# Patient Record
Sex: MALE | Race: WHITE
[De-identification: names, ages, dates, MRNs, and addresses within clinical notes are randomized per-mention and may not be internally consistent; named-entity substitution may affect disease eponyms.]

---

## 2022-09-22 PROBLEM — Z00.00 ENCOUNTER FOR PREVENTIVE HEALTH EXAMINATION: Status: ACTIVE | Noted: 2022-09-22

## 2022-09-26 DIAGNOSIS — Z87.448 PERSONAL HISTORY OF OTHER DISEASES OF URINARY SYSTEM: ICD-10-CM

## 2022-09-26 DIAGNOSIS — Z87.442 PERSONAL HISTORY OF URINARY CALCULI: ICD-10-CM

## 2022-09-26 DIAGNOSIS — K46.9 UNSPECIFIED ABDOMINAL HERNIA W/OUT OBSTRUCTION OR GANGRENE: ICD-10-CM

## 2022-09-26 DIAGNOSIS — Z72.89 OTHER PROBLEMS RELATED TO LIFESTYLE: ICD-10-CM

## 2022-09-26 DIAGNOSIS — Z86.59 PERSONAL HISTORY OF OTHER MENTAL AND BEHAVIORAL DISORDERS: ICD-10-CM

## 2022-09-26 RX ORDER — DEXTROAMPHETAMINE SULFATE, DEXTROAMPHETAMINE SACCHARATE, AMPHETAMINE SULFATE AND AMPHETAMINE ASPARTATE 5; 5; 5; 5 MG/1; MG/1; MG/1; MG/1
20 CAPSULE, EXTENDED RELEASE ORAL
Refills: 0 | Status: ACTIVE | COMMUNITY

## 2022-09-27 ENCOUNTER — APPOINTMENT (OUTPATIENT)
Dept: SURGERY | Facility: CLINIC | Age: 32
End: 2022-09-27

## 2022-09-27 VITALS — SYSTOLIC BLOOD PRESSURE: 185 MMHG | DIASTOLIC BLOOD PRESSURE: 113 MMHG

## 2022-09-27 VITALS
TEMPERATURE: 98.3 F | SYSTOLIC BLOOD PRESSURE: 187 MMHG | HEIGHT: 73 IN | HEART RATE: 103 BPM | RESPIRATION RATE: 20 BRPM | OXYGEN SATURATION: 99 % | BODY MASS INDEX: 25.18 KG/M2 | DIASTOLIC BLOOD PRESSURE: 120 MMHG | WEIGHT: 190 LBS

## 2022-09-27 PROCEDURE — 99204 OFFICE O/P NEW MOD 45 MIN: CPT

## 2022-09-27 NOTE — REASON FOR VISIT
[Initial Eval - Existing Diagnosis] : an initial evaluation of an existing diagnosis [FreeTextEntry1] : RIGHT inguinal hernia

## 2022-09-27 NOTE — PHYSICAL EXAM
[Respiratory Effort] : normal respiratory effort [Calm] : calm [JVD] : no jugular venous distention  [de-identified] : NAD [de-identified] : normal [de-identified] : soft, nontender, + small right inguinal hernia.  No bulge.  No palpable hernia on the left.  [de-identified] : some red splotches on neck, presumable related to mild anxiety

## 2022-09-27 NOTE — HISTORY OF PRESENT ILLNESS
[de-identified] : Mr. ERNESTO LEON presents today with discomfort in the right groin.  He has had the hernia for approx 2 years.  He is not aware of a bulge in the groin however over the past 2 weeks the hernia has become increasingly symptomatic.  It is causing him a "pinching" sensation, and some tugging sensation that radiates to the groin.  There is a slight suggestion of some pressure in the left groin as well, but not as significant as the right.  He  denies any obstructive symptoms such as nausea/vomiting or change in bowel habits.  Mr. LEON states that increased activity such as riding his [place], and coughing or sneezing seem to aggravate the symptoms.  Rest seems to relieve them.  His PCP, Dr. Jimenes was able to diagnose a hernia on physical exam and he is now here to discuss further work up and management.\par His past medical hx includes a hydrocele repair at age 3-4, tonsellectomy, ear tubes, growth plate repair, htn (trying to manage with weight loss and exercise, and avoid meds)\par He takes Adderol daily and has NKDA.\par He plans to stay at his moms after surgery for help in the first few days.  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 \par

## 2022-09-27 NOTE — PLAN
[FreeTextEntry1] : ERNESTO LEON has a RIGHT inguinal hernia determined by physical exam.  We discussed the options for management which include observation of asymptomatic or minimally symptomatic hernias, as well as surgical repair.  I have recommended a laparoscopic/robotic approach to this particular hernia given the quicker recovery time and superior visualization of the anatomy.  We discussed the risks of the surgery which include, but are not limited to injury to the gonadal structures, intestinal injury, vascular injury, hernia recurrence, mesh infection, seroma, or hematoma.  We discussed the likelihood of bruising and swelling in the immediate post op period in the groin and that this should be managed with ice packs for the first 24 hrs. \par Post operative activity restrictions include no heavy lifting or core activities/straining for 10 days. Routine daily activities can be resumed in 1-2 days and light activity (such as walking for exercise or jogging) can be resumed in 7 days.  Driving can be resumed when pain level is felt to be manageable.   No swimming in pool or hot tub for 1 week post operatively.  Return to work is generally in 7-10 days, but is individualized according to the patient.  Mr. LEON understands the risks benefits and alternatives of the proposed inguinal hernia repair and would like to proceed.\par \par We will determine a date for surgery at the his convenience and obtain medical clearance as appropriate. \par \par \par

## 2022-09-27 NOTE — CONSULT LETTER
[Dear  ___] : Dear  [unfilled], [Consult Letter:] : I had the pleasure of evaluating your patient, [unfilled]. [Please see my note below.] : Please see my note below. [Consult Closing:] : Thank you very much for allowing me to participate in the care of this patient.  If you have any questions, please do not hesitate to contact me. [Sincerely,] : Sincerely, [FreeTextEntry3] : Venecia Coppola MD FACS\par Acute Care and General Surgery\par [facility]\par \par Office phone: [phone]\par Cell phone:  [phone]\par email:  [email or web address]\par

## 2022-10-05 ENCOUNTER — TRANSCRIPTION ENCOUNTER (OUTPATIENT)
Age: 32
End: 2022-10-05

## 2022-10-05 ENCOUNTER — APPOINTMENT (OUTPATIENT)
Dept: SURGERY | Facility: HOSPITAL | Age: 32
End: 2022-10-05

## 2022-10-25 ENCOUNTER — APPOINTMENT (OUTPATIENT)
Dept: SURGERY | Facility: CLINIC | Age: 32
End: 2022-10-25

## 2022-10-25 VITALS
BODY MASS INDEX: 25.18 KG/M2 | WEIGHT: 190 LBS | HEIGHT: 73 IN | DIASTOLIC BLOOD PRESSURE: 102 MMHG | SYSTOLIC BLOOD PRESSURE: 180 MMHG | TEMPERATURE: 97.6 F | HEART RATE: 99 BPM

## 2022-10-25 DIAGNOSIS — K40.90 UNILATERAL INGUINAL HERNIA, W/OUT OBSTRUCTION OR GANGRENE, NOT SPECIFIED AS RECURRENT: ICD-10-CM

## 2022-10-25 PROCEDURE — 99024 POSTOP FOLLOW-UP VISIT: CPT

## 2022-10-25 NOTE — PLAN
[FreeTextEntry1] : Pt may resume all normal activities including exercise and sports.  No restrictions.  Follow up as needed.

## 2022-10-25 NOTE — HISTORY OF PRESENT ILLNESS
[de-identified] : 32 yr old who is now s/p robotic RIH repair on 10/5/22 doing well.  He states he had an easy recovery and after taking it easy for a few days, was back to work and is now ready to resume all normal activites.

## 2022-10-25 NOTE — PHYSICAL EXAM
[de-identified] : NAD [de-identified] : abdominal incisions well healed.  no bruising.  no ecchymosis in groin.

## 2022-10-25 NOTE — CONSULT LETTER
[Dear  ___] : Dear  [unfilled], [Courtesy Letter:] : I had the pleasure of seeing your patient, [unfilled], in my office today. [Please see my note below.] : Please see my note below. [Consult Closing:] : Thank you very much for allowing me to participate in the care of this patient.  If you have any questions, please do not hesitate to contact me. [Sincerely,] : Sincerely, [FreeTextEntry3] : Venecia Coppola MD FACS\par Acute Care and General Surgery\par [facility]\par \par Office phone: [phone]\par Cell phone:  [phone]\par email:  [email or web address]\par